# Patient Record
Sex: FEMALE | Race: BLACK OR AFRICAN AMERICAN | NOT HISPANIC OR LATINO | Employment: STUDENT | URBAN - METROPOLITAN AREA
[De-identification: names, ages, dates, MRNs, and addresses within clinical notes are randomized per-mention and may not be internally consistent; named-entity substitution may affect disease eponyms.]

---
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
## 2024-06-11 ENCOUNTER — TELEPHONE (OUTPATIENT)
Dept: FAMILY MEDICINE | Facility: CLINIC | Age: 19
End: 2024-06-11
Payer: COMMERCIAL

## 2024-06-11 NOTE — TELEPHONE ENCOUNTER
----- Message from Amber Ravi sent at 6/11/2024 12:09 PM CDT -----  Regarding: self [phone]  .Type: Patient Call Back    Who called:self    What is the request in detail:patient requesting to schedule an mayra for back pain. Unable to do so on book it.    Can the clinic reply by [facility]?no    Would the patient rather a call back or a response via [facility]?call back    Best call back number [phone]